# Patient Record
Sex: MALE | Race: WHITE | URBAN - METROPOLITAN AREA
[De-identification: names, ages, dates, MRNs, and addresses within clinical notes are randomized per-mention and may not be internally consistent; named-entity substitution may affect disease eponyms.]

---

## 2017-02-06 ENCOUNTER — OFFICE VISIT (OUTPATIENT)
Dept: FAMILY MEDICINE CLINIC | Facility: CLINIC | Age: 52
End: 2017-02-06

## 2017-02-06 VITALS
DIASTOLIC BLOOD PRESSURE: 88 MMHG | RESPIRATION RATE: 16 BRPM | TEMPERATURE: 99 F | BODY MASS INDEX: 30.01 KG/M2 | WEIGHT: 209.63 LBS | OXYGEN SATURATION: 96 % | SYSTOLIC BLOOD PRESSURE: 137 MMHG | HEIGHT: 70 IN | HEART RATE: 89 BPM

## 2017-02-06 DIAGNOSIS — R05.9 COUGH: ICD-10-CM

## 2017-02-06 DIAGNOSIS — B34.9 VIRAL SYNDROME: Primary | ICD-10-CM

## 2017-02-06 PROCEDURE — 99212 OFFICE O/P EST SF 10 MIN: CPT | Performed by: FAMILY MEDICINE

## 2017-02-06 RX ORDER — BENZONATATE 200 MG/1
200 CAPSULE ORAL 3 TIMES DAILY PRN
Qty: 30 CAPSULE | Refills: 1 | Status: SHIPPED | OUTPATIENT
Start: 2017-02-06 | End: 2017-03-23 | Stop reason: ALTCHOICE

## 2017-02-06 NOTE — PROGRESS NOTES
Brad Rodriguez IS A 46year old male HERE FOR Patient presents with:  URI: x4 days  headache,blood shot eyes, coughing none stop       History of present illness:     Tickle in throat Thurs evening, felt \"floored\" Friday through last night, some chills.  \" Multiple Vitamins-Minerals (CENTRUM SILVER) Oral Tab Take 1 tablet by mouth daily.  Disp:  Rfl:        Allergy:      No Known Allergies    Family history:       Family History   Problem Relation Age of Onset   • Diabetes Father    • Heart Attack Father 76 can help at bedtime. Tylenol or ibuprofen for pain/fever. (ibuprofen if higher fever or chills, otherwise Tylenol). Call if persistent/recurrent fever, chest pain, shortness of breath, coughing blood.  Earache, sinus pain or severe recurring headache

## 2017-02-06 NOTE — PATIENT INSTRUCTIONS
For cough, recommend benzonatate 200 mg 3 times daily during day as needed for cough and you can try Delsym dextromethorphan OTC can help at bedtime. Tylenol or ibuprofen for pain/fever. (ibuprofen if higher fever or chills, otherwise Tylenol).     Call

## 2017-02-15 ENCOUNTER — TELEPHONE (OUTPATIENT)
Dept: FAMILY MEDICINE CLINIC | Facility: CLINIC | Age: 52
End: 2017-02-15

## 2017-02-15 DIAGNOSIS — L57.0 ACTINIC KERATOSIS: Primary | ICD-10-CM

## 2017-02-15 DIAGNOSIS — L73.8 SENILE SEBACEOUS GLAND HYPERPLASIA: ICD-10-CM

## 2017-02-15 DIAGNOSIS — L82.1 SEBORRHEIC KERATOSIS: ICD-10-CM

## 2017-02-15 NOTE — TELEPHONE ENCOUNTER
.Reason for the order/referral: referral to dermatology   PCP: [email or web address] Dr. Miranda Gracia   Refer to Provider (first and last name): Tonia Barragan   Specialty: dermatology   Patient Insurance: Payor: [facility] [place] [facility] / Plan: Christopher Clemons / Product Type: O /   [place]

## 2017-03-13 ENCOUNTER — TELEPHONE (OUTPATIENT)
Dept: FAMILY MEDICINE CLINIC | Facility: CLINIC | Age: 52
End: 2017-03-13

## 2017-03-13 NOTE — TELEPHONE ENCOUNTER
All last 3-4 month visits have been for acute reasons. He should make an appointment to discuss these treatment options. There is a generic source for Viagra that I could talk with him about when he comes in.

## 2017-03-13 NOTE — TELEPHONE ENCOUNTER
Future Appointments  Date Time Provider Ivonne Gail   3/13/2017 4:00 PM JANA Campbell [facility] [place]   3/23/2017 9:00 AM Queenie Hernandez MD [facility] [address]   5/17/2017 5:00 PM Ani Ellison MD [address] [place] [address]   6/12/2017 10:30 AM Michael

## 2017-03-23 ENCOUNTER — TELEPHONE (OUTPATIENT)
Dept: FAMILY MEDICINE CLINIC | Facility: CLINIC | Age: 52
End: 2017-03-23

## 2017-03-23 ENCOUNTER — OFFICE VISIT (OUTPATIENT)
Dept: FAMILY MEDICINE CLINIC | Facility: CLINIC | Age: 52
End: 2017-03-23

## 2017-03-23 VITALS
SYSTOLIC BLOOD PRESSURE: 118 MMHG | HEART RATE: 90 BPM | WEIGHT: 211 LBS | OXYGEN SATURATION: 96 % | TEMPERATURE: 98 F | BODY MASS INDEX: 30.21 KG/M2 | HEIGHT: 70 IN | DIASTOLIC BLOOD PRESSURE: 72 MMHG | RESPIRATION RATE: 16 BRPM

## 2017-03-23 DIAGNOSIS — N52.9 ERECTILE DYSFUNCTION, UNSPECIFIED ERECTILE DYSFUNCTION TYPE: Primary | ICD-10-CM

## 2017-03-23 PROCEDURE — 99213 OFFICE O/P EST LOW 20 MIN: CPT | Performed by: FAMILY MEDICINE

## 2017-03-23 RX ORDER — TADALAFIL 20 MG/1
TABLET ORAL AS NEEDED
Qty: 8 TABLET | Refills: 3 | Status: SHIPPED | OUTPATIENT
Start: 2017-03-23

## 2017-03-23 RX ORDER — TESTOSTERONE 16.2 MG/G
GEL TRANSDERMAL
Refills: 2 | COMMUNITY
Start: 2017-03-01 | End: 2017-03-23

## 2017-03-23 NOTE — PATIENT INSTRUCTIONS
Discussed pros & cons of Viagra, Levitra, Cialis. Cialis is on your insurance plan and lasts longest, will try 1/2 tablet initially, up to 1 tablet of 20 mg, an hour or two before anticipated sexual activity, may last up to 36 hours in effectiveness.  Repor

## 2017-03-23 NOTE — PROGRESS NOTES
Marcelino Bledsoe IS A 46year old male HERE FOR Patient presents with:  Medication Request: Discuss Viagra Rx       History of present illness:     Sees Dr. Roman Clifton for testosterone, has had some acne as side effect.      Still has ED, has not tried any medication Vitamins-Minerals (CENTRUM SILVER) Oral Tab Take 1 tablet by mouth daily. Disp:  Rfl:    LEVOTHYROXINE SODIUM 125 MCG Oral Tab TAKE 1 TABLET BY MOUTH BEFORE BREAKFAST.  Disp: 90 tablet Rfl: 0       Allergy:      No Known Allergies    Family history:       F avoiding nitrates (e.g. Nitroglycerin), pt doesn't use it and has no CVD history. Test results: none new.        Assessment & Plan:   Lynda Wilkes was seen today for medication request.    Diagnoses and all orders for this visit:    Erectile dysfunction, unsp

## 2017-03-24 NOTE — TELEPHONE ENCOUNTER
Pt called to say Pharmacy sent over auth paper. Need to keep look-out for fax. Asked to speak with Dr Alvaro monsivais. Sent to Ngozi ramirez.

## 2017-05-31 ENCOUNTER — TELEPHONE (OUTPATIENT)
Dept: FAMILY MEDICINE CLINIC | Facility: CLINIC | Age: 52
End: 2017-05-31

## 2017-05-31 DIAGNOSIS — D35.2 PITUITARY ADENOMA (HCC): ICD-10-CM

## 2017-05-31 DIAGNOSIS — E29.1 HYPOGONADISM IN MALE: ICD-10-CM

## 2017-05-31 DIAGNOSIS — E03.8 SECONDARY HYPOTHYROIDISM: Primary | ICD-10-CM

## 2017-05-31 NOTE — TELEPHONE ENCOUNTER
referral  Received:  Nubia Manuel [facility]       Cc: [facility] Referral Pool       Phone Number: [phone]                     .Reason for the order/referral: referral to Dr. Isidro Matamoros   PCP: [email or web address] Dr. Patricia Esquivel   Refer to [facility]

## 2017-06-12 ENCOUNTER — TELEPHONE (OUTPATIENT)
Dept: FAMILY MEDICINE CLINIC | Facility: CLINIC | Age: 52
End: 2017-06-12

## 2017-06-12 DIAGNOSIS — E29.1 MALE HYPOGONADISM: ICD-10-CM

## 2017-06-12 DIAGNOSIS — D35.2 PITUITARY ADENOMA (HCC): ICD-10-CM

## 2017-06-12 DIAGNOSIS — E03.8 SECONDARY HYPOTHYROIDISM: Primary | ICD-10-CM

## 2017-06-12 RX ORDER — OMEPRAZOLE 20 MG/1
CAPSULE, DELAYED RELEASE ORAL
Refills: 3 | COMMUNITY
Start: 2017-04-27 | End: 2018-02-23

## 2017-06-12 NOTE — TELEPHONE ENCOUNTER
I'd call pt to ask directly if he wanted another opinion and if so, he may need to go outside of [facility] since they may not favor patients going to different doctors within the same medical group.

## 2017-06-12 NOTE — TELEPHONE ENCOUNTER
Future Appointments  Date Time Provider Ivonne Gail   6/13/2017 10:40 AM Jony Dailey MD [address]   8/14/2017 2:00 PM Vamsi Rutledge MD [address] [facility]

## 2017-06-12 NOTE — TELEPHONE ENCOUNTER
Pt was just seen by Dr Judy Delatorreous today    Dr Luis Alberto Monreal is also out of [facility]. Has pt discussed wanting a second opinion? He has multiple endocrine issues.     Last OV with Dr Maryanne Morrow was 3/23/17

## 2017-06-12 NOTE — TELEPHONE ENCOUNTER
Zayda Jaimes  P [facility]        Cc: [facility]       Phone Number: [phone]                     Patient Name: Sheldon Dodge   : [birth year]   Reason for the order/referral: 2nd opinion   PCP: Lorena Greer   Refer to

## 2017-06-14 PROCEDURE — 84403 ASSAY OF TOTAL TESTOSTERONE: CPT | Performed by: INTERNAL MEDICINE

## 2017-06-14 PROCEDURE — 84402 ASSAY OF FREE TESTOSTERONE: CPT | Performed by: INTERNAL MEDICINE

## 2017-06-14 PROCEDURE — 82533 TOTAL CORTISOL: CPT | Performed by: INTERNAL MEDICINE

## 2017-06-30 ENCOUNTER — TELEPHONE (OUTPATIENT)
Dept: FAMILY MEDICINE CLINIC | Facility: CLINIC | Age: 52
End: 2017-06-30

## 2017-06-30 DIAGNOSIS — G47.33 OBSTRUCTIVE SLEEP APNEA SYNDROME: Primary | ICD-10-CM

## 2017-06-30 NOTE — TELEPHONE ENCOUNTER
Noted from [address] last 7/20/16 requesting new referral for annual F/U with STEPHANE device    Order placed

## 2017-06-30 NOTE — TELEPHONE ENCOUNTER
Faxed a Referral over June 20th for Dr Aria More. Referral needs to be put into Epic for pt. They do not see Referral in EPIC on their end. Pt has appt/procedure July 26th. Pls call or Fax.

## 2017-07-06 ENCOUNTER — MED REC SCAN ONLY (OUTPATIENT)
Dept: FAMILY MEDICINE CLINIC | Facility: CLINIC | Age: 52
End: 2017-07-06

## 2017-08-03 ENCOUNTER — TELEPHONE (OUTPATIENT)
Dept: FAMILY MEDICINE CLINIC | Facility: CLINIC | Age: 52
End: 2017-08-03

## 2017-08-03 DIAGNOSIS — G47.31 PRIMARY CENTRAL SLEEP APNEA: Primary | ICD-10-CM

## 2017-08-14 ENCOUNTER — OFFICE VISIT (OUTPATIENT)
Dept: FAMILY MEDICINE CLINIC | Facility: CLINIC | Age: 52
End: 2017-08-14

## 2017-08-14 VITALS
HEIGHT: 70 IN | DIASTOLIC BLOOD PRESSURE: 80 MMHG | TEMPERATURE: 98 F | WEIGHT: 210.19 LBS | BODY MASS INDEX: 30.09 KG/M2 | RESPIRATION RATE: 15 BRPM | HEART RATE: 80 BPM | SYSTOLIC BLOOD PRESSURE: 108 MMHG | OXYGEN SATURATION: 94 %

## 2017-08-14 DIAGNOSIS — M22.2X1 PATELLOFEMORAL SYNDROME OF RIGHT KNEE: Primary | ICD-10-CM

## 2017-08-14 PROCEDURE — 99213 OFFICE O/P EST LOW 20 MIN: CPT | Performed by: FAMILY MEDICINE

## 2017-08-14 RX ORDER — VENLAFAXINE HYDROCHLORIDE 37.5 MG/1
CAPSULE, EXTENDED RELEASE ORAL
Refills: 0 | COMMUNITY
Start: 2017-07-14 | End: 2017-08-14

## 2017-08-14 NOTE — PROGRESS NOTES
Rupesh Nolan IS A 45year old male HERE FOR Patient presents with:  Knee Pain: r knee        History of present illness:     Treadmill 1 hour qod a year ago.  Elliptical & treadmill fine    Ran 6 miles at a time, R knee hurt (didn't like pavement)    Baudilio omeprazole 20 MG Oral Capsule Delayed Release TAKE ONE CAPSULE BY MOUTH THREE TIMES PER WEEK, 30 MINUTES PRIOR TO BREAKFAST. Disp:  Rfl: 3   Tadalafil (CIALIS) 20 MG Oral Tab Take 0.5-1 tablets (10-20 mg total) by mouth as needed for Erectile Dysfunction. Moderate crepitus right knee, normal left. . Full ROM     Test results:       Assessment & Plan:   Monica Coreas was seen today for knee pain.     Diagnoses and all orders for this visit:    Patellofemoral syndrome of right knee  -     XR KNEE (1 OR 2 VIEWS), RIGHT · A feeling of the knee catching or locking  · A grinding or crackling noise in your knee  Treatment for patellofemoral syndrome  Treatment focuses on reducing pain and avoiding further injury. Treatments may include:  · Rest your leg.  This gives your knee

## 2017-08-14 NOTE — PATIENT INSTRUCTIONS
Straight leg raises and/or last 20-30 degree range of motion, sets of 10-20 twice a day, can start with 2-3 pound ankle weights and increase to about 10 pounds. Physical therapy referral for additional suggestions.      X-ray and consider referral to ort Treatments may include:  · Rest your leg. This gives your knee time to recover. You may need to avoid or change the activity that caused the problem, such as not running for a while. · Prescription or over-the-counter pain medicines.  These help reduce inf

## 2017-08-15 ENCOUNTER — HOSPITAL ENCOUNTER (OUTPATIENT)
Dept: GENERAL RADIOLOGY | Age: 52
Discharge: HOME | End: 2017-08-15
Attending: FAMILY MEDICINE
Payer: COMMERCIAL

## 2017-08-15 DIAGNOSIS — M22.2X1 PATELLOFEMORAL SYNDROME OF RIGHT KNEE: ICD-10-CM

## 2017-08-15 PROCEDURE — 73560 X-RAY EXAM OF KNEE 1 OR 2: CPT | Performed by: FAMILY MEDICINE

## 2017-08-29 ENCOUNTER — HOSPITAL ENCOUNTER (OUTPATIENT)
Dept: PHYSICAL THERAPY | Facility: HOSPITAL | Age: 52
Setting detail: THERAPIES SERIES
Discharge: HOME | End: 2017-08-29
Attending: FAMILY MEDICINE
Payer: COMMERCIAL

## 2017-08-29 DIAGNOSIS — M22.2X1 PATELLOFEMORAL SYNDROME OF RIGHT KNEE: ICD-10-CM

## 2017-08-29 PROCEDURE — 97161 PT EVAL LOW COMPLEX 20 MIN: CPT

## 2017-08-29 PROCEDURE — 97110 THERAPEUTIC EXERCISES: CPT

## 2017-08-29 NOTE — PROGRESS NOTES
LOWER EXTREMITY EVALUATION:   Referring Physician: Dr. Albrecht Player  Diagnosis: R patellofemoral pain     Date of Service: 8/29/2017     PATIENT SUMMARY   Margaret Luna is a 45year old male who presents to therapy today with complaints of anterior superolateral address the above impairments to decrease pain and return to prior level of function.     Precautions:  None  OBJECTIVE:   Observation: pes adductus bilaterally  Gait: WNL  Palpation: TTP lateral patella, no TTP medial or lateral joint line    AROM:   Hip K seen for 2 x/week or a total of 8 visits over a 90 day period. Treatment will include: Manual Therapy; Therapeutic Exercises; Neuromuscular Re-education;  Therapeutic Activity; Gait Training; Pt education; Home exercise program instructions    Education or

## 2017-08-30 ENCOUNTER — MED REC SCAN ONLY (OUTPATIENT)
Dept: FAMILY MEDICINE CLINIC | Facility: CLINIC | Age: 52
End: 2017-08-30

## 2017-09-01 ENCOUNTER — HOSPITAL ENCOUNTER (OUTPATIENT)
Dept: PHYSICAL THERAPY | Facility: HOSPITAL | Age: 52
Setting detail: THERAPIES SERIES
Discharge: HOME | End: 2017-09-01
Attending: FAMILY MEDICINE
Payer: COMMERCIAL

## 2017-09-01 PROCEDURE — 97110 THERAPEUTIC EXERCISES: CPT

## 2017-09-01 PROCEDURE — 97140 MANUAL THERAPY 1/> REGIONS: CPT

## 2017-09-01 NOTE — PROGRESS NOTES
Dx: R patellofemoral pain            Authorized # of Visits:  8         Next MD visit: none scheduled  Fall Risk: standard         Precautions: n/a             Subjective: Patient reports that his knee feels a bit better.  Still has some pain descending sta

## 2017-09-05 ENCOUNTER — HOSPITAL ENCOUNTER (OUTPATIENT)
Dept: PHYSICAL THERAPY | Facility: HOSPITAL | Age: 52
Setting detail: THERAPIES SERIES
Discharge: HOME | End: 2017-09-05
Attending: FAMILY MEDICINE
Payer: COMMERCIAL

## 2017-09-05 PROCEDURE — 84402 ASSAY OF FREE TESTOSTERONE: CPT | Performed by: INTERNAL MEDICINE

## 2017-09-05 PROCEDURE — 84403 ASSAY OF TOTAL TESTOSTERONE: CPT | Performed by: INTERNAL MEDICINE

## 2017-09-05 PROCEDURE — 97140 MANUAL THERAPY 1/> REGIONS: CPT

## 2017-09-05 PROCEDURE — 97110 THERAPEUTIC EXERCISES: CPT

## 2017-09-05 NOTE — PROGRESS NOTES
Dx: R patellofemoral pain            Authorized # of Visits:  8         Next MD visit: none scheduled  Fall Risk: standard         Precautions: n/a             Subjective: Patient reports continued improvement in knee symptoms.  Still hesitant with descendi Treatment Time: 40 min

## 2017-09-08 ENCOUNTER — HOSPITAL ENCOUNTER (OUTPATIENT)
Dept: PHYSICAL THERAPY | Facility: HOSPITAL | Age: 52
Setting detail: THERAPIES SERIES
Discharge: HOME | End: 2017-09-08
Attending: FAMILY MEDICINE
Payer: COMMERCIAL

## 2017-09-08 PROCEDURE — 97140 MANUAL THERAPY 1/> REGIONS: CPT

## 2017-09-08 PROCEDURE — 97110 THERAPEUTIC EXERCISES: CPT

## 2017-09-08 NOTE — PROGRESS NOTES
Dx: R patellofemoral pain            Authorized # of Visits:  8         Next MD visit: none scheduled  Fall Risk: standard         Precautions: n/a             Subjective: Patient states that he felt ok the rest of the day after previous session but then h patellar superior glide in 90 deg flexion       Treadmill jog gait analysis Forward lunge, 2x10 Seated self patellar mobilization, 1 min        Shuttle single leg press, 87#, 3x15 1/2 kneel hip flexor/RF stretch, 10x5 sec        Downhill walk forward step

## 2017-09-12 ENCOUNTER — APPOINTMENT (OUTPATIENT)
Dept: PHYSICAL THERAPY | Facility: HOSPITAL | Age: 52
End: 2017-09-12
Attending: FAMILY MEDICINE
Payer: COMMERCIAL

## 2017-09-12 ENCOUNTER — MED REC SCAN ONLY (OUTPATIENT)
Dept: FAMILY MEDICINE CLINIC | Facility: CLINIC | Age: 52
End: 2017-09-12

## 2017-09-15 ENCOUNTER — HOSPITAL ENCOUNTER (OUTPATIENT)
Dept: PHYSICAL THERAPY | Facility: HOSPITAL | Age: 52
Setting detail: THERAPIES SERIES
Discharge: HOME | End: 2017-09-15
Attending: FAMILY MEDICINE
Payer: COMMERCIAL

## 2017-09-15 PROCEDURE — 97110 THERAPEUTIC EXERCISES: CPT

## 2017-09-15 PROCEDURE — 97140 MANUAL THERAPY 1/> REGIONS: CPT

## 2017-09-18 ENCOUNTER — TELEPHONE (OUTPATIENT)
Dept: FAMILY MEDICINE CLINIC | Facility: CLINIC | Age: 52
End: 2017-09-18

## 2017-09-18 NOTE — TELEPHONE ENCOUNTER
Left VM for pt to call us with new insurance information. Need copy of INS card for Referrals to be put in, per Nehemiah Pinon. Waiting for Return Call from Patient.

## 2017-09-18 NOTE — PROGRESS NOTES
Dx: R patellofemoral pain            Authorized # of Visits:  8         Next MD visit: none scheduled  Fall Risk: standard         Precautions: n/a             Subjective: Patient reports that his pain has decreased and that it feels now about how it felt Lateral step down, 3x8 PFJ inf glide, neutral and 90 deg flexion patellar superior glide in 90 deg flexion X      Treadmill jog gait analysis Forward lunge, 2x10 Seated self patellar mobilization, 1 min Single leg press, 62#, 3x15       Shuttle single le

## 2017-09-18 NOTE — TELEPHONE ENCOUNTER
Received a FAX from [employer] from [employer]. Patient's insurance changed 9-1-17, therefore, a new Referral is needed.

## 2017-09-19 ENCOUNTER — APPOINTMENT (OUTPATIENT)
Dept: PHYSICAL THERAPY | Facility: HOSPITAL | Age: 52
End: 2017-09-19
Attending: FAMILY MEDICINE
Payer: COMMERCIAL

## 2017-09-19 NOTE — TELEPHONE ENCOUNTER
All papers faxed to RADHA Elliott at referrals Shaquille Barnett to see if current referral can be updated with new HMO info

## 2017-09-19 NOTE — TELEPHONE ENCOUNTER
Received via FAX Tuesday morning new INS card. Entered all information and scanned copy of INS card. Will need to obtain copy of real INS card at later date.

## 2017-09-26 ENCOUNTER — APPOINTMENT (OUTPATIENT)
Dept: PHYSICAL THERAPY | Facility: HOSPITAL | Age: 52
End: 2017-09-26
Attending: FAMILY MEDICINE
Payer: COMMERCIAL

## 2017-09-27 ENCOUNTER — HOSPITAL ENCOUNTER (OUTPATIENT)
Dept: PHYSICAL THERAPY | Facility: HOSPITAL | Age: 52
Setting detail: THERAPIES SERIES
Discharge: HOME | End: 2017-09-27
Attending: FAMILY MEDICINE
Payer: COMMERCIAL

## 2017-09-27 DIAGNOSIS — M22.2X1 PATELLOFEMORAL SYNDROME OF RIGHT KNEE: ICD-10-CM

## 2017-09-27 PROCEDURE — 97140 MANUAL THERAPY 1/> REGIONS: CPT

## 2017-09-27 PROCEDURE — 97110 THERAPEUTIC EXERCISES: CPT

## 2017-09-27 NOTE — PROGRESS NOTES
Dx: R patellofemoral pain            Authorized # of Visits:  8         Next MD visit: none scheduled  Fall Risk: standard         Precautions: n/a             Subjective: Patient has no pain with stairs.  He was able to walk in [place] up and down hills w X Split squat, 15     Treadmill jog gait analysis Forward lunge, 2x10 Seated self patellar mobilization, 1 min Single leg press, 62#, 3x15 X, 75#/87#, 3x15      Shuttle single leg press, 87#, 3x15 1/2 kneel hip flexor/RF stretch, 10x5 sec Single leg stance

## 2017-10-06 ENCOUNTER — HOSPITAL ENCOUNTER (OUTPATIENT)
Dept: PHYSICAL THERAPY | Facility: HOSPITAL | Age: 52
Setting detail: THERAPIES SERIES
Discharge: HOME | End: 2017-10-06
Attending: FAMILY MEDICINE
Payer: COMMERCIAL

## 2017-10-06 DIAGNOSIS — M22.2X1 PATELLOFEMORAL SYNDROME OF RIGHT KNEE: ICD-10-CM

## 2017-10-06 PROCEDURE — 97110 THERAPEUTIC EXERCISES: CPT

## 2017-10-06 PROCEDURE — 97140 MANUAL THERAPY 1/> REGIONS: CPT

## 2017-10-06 NOTE — PROGRESS NOTES
Dx: R patellofemoral pain            Authorized # of Visits:  8         Next MD visit: none scheduled  Fall Risk: standard         Precautions: n/a             Subjective: Patient states that he is able to ascend and descend stairs without pain.  Stretches press, 62#, 3x15 X, 75#/87#, 3x15 Active knee extension w/ tibial IR     Shuttle single leg press, 87#, 3x15 1/2 kneel hip flexor/RF stretch, 10x5 sec Single leg stance, green TB flexion, abduction, extension, 3x5 Seated knee extension, 23#, 2x15 X, 3x15

## 2018-01-10 PROCEDURE — 84402 ASSAY OF FREE TESTOSTERONE: CPT | Performed by: INTERNAL MEDICINE

## 2018-01-10 PROCEDURE — 84403 ASSAY OF TOTAL TESTOSTERONE: CPT | Performed by: INTERNAL MEDICINE

## 2018-01-16 PROBLEM — Z86.018 HISTORY OF PITUITARY ADENOMA: Status: ACTIVE | Noted: 2018-01-16

## 2018-02-02 PROCEDURE — 36415 COLL VENOUS BLD VENIPUNCTURE: CPT | Performed by: INTERNAL MEDICINE

## 2018-02-02 PROCEDURE — 84402 ASSAY OF FREE TESTOSTERONE: CPT | Performed by: INTERNAL MEDICINE

## 2018-02-02 PROCEDURE — 84403 ASSAY OF TOTAL TESTOSTERONE: CPT | Performed by: INTERNAL MEDICINE

## 2018-02-12 ENCOUNTER — OFFICE VISIT (OUTPATIENT)
Dept: FAMILY MEDICINE CLINIC | Facility: CLINIC | Age: 53
End: 2018-02-12

## 2018-02-12 VITALS
DIASTOLIC BLOOD PRESSURE: 86 MMHG | HEART RATE: 92 BPM | HEIGHT: 68.25 IN | OXYGEN SATURATION: 98 % | RESPIRATION RATE: 16 BRPM | WEIGHT: 203.63 LBS | SYSTOLIC BLOOD PRESSURE: 128 MMHG | TEMPERATURE: 98 F | BODY MASS INDEX: 30.86 KG/M2

## 2018-02-12 DIAGNOSIS — Z11.59 ENCOUNTER FOR HEPATITIS C SCREENING TEST FOR LOW RISK PATIENT: ICD-10-CM

## 2018-02-12 DIAGNOSIS — Z23 NEED FOR TDAP VACCINATION: ICD-10-CM

## 2018-02-12 DIAGNOSIS — R73.01 IMPAIRED FASTING GLUCOSE: ICD-10-CM

## 2018-02-12 DIAGNOSIS — Z13.220 SCREENING, LIPID: ICD-10-CM

## 2018-02-12 DIAGNOSIS — Z00.00 WELL ADULT EXAM: Primary | ICD-10-CM

## 2018-02-12 DIAGNOSIS — Z13.1 SCREENING FOR DIABETES MELLITUS: ICD-10-CM

## 2018-02-12 DIAGNOSIS — E29.1 TESTOSTERONE DEFICIENCY IN MALE: ICD-10-CM

## 2018-02-12 DIAGNOSIS — E78.2 MIXED HYPERLIPIDEMIA: ICD-10-CM

## 2018-02-12 DIAGNOSIS — E03.9 ACQUIRED HYPOTHYROIDISM: ICD-10-CM

## 2018-02-12 DIAGNOSIS — Z86.018 HISTORY OF BENIGN PITUITARY TUMOR: ICD-10-CM

## 2018-02-12 PROCEDURE — 90471 IMMUNIZATION ADMIN: CPT | Performed by: FAMILY MEDICINE

## 2018-02-12 PROCEDURE — 90715 TDAP VACCINE 7 YRS/> IM: CPT | Performed by: FAMILY MEDICINE

## 2018-02-12 PROCEDURE — 99396 PREV VISIT EST AGE 40-64: CPT | Performed by: FAMILY MEDICINE

## 2018-02-12 RX ORDER — ARIPIPRAZOLE 2 MG/1
TABLET ORAL
Refills: 1 | COMMUNITY
Start: 2018-02-07 | End: 2018-04-16 | Stop reason: ALTCHOICE

## 2018-02-12 NOTE — PROGRESS NOTES
Win Spencer is a 45year old male here for Patient presents with: Well Adult: Room 5. Physical.   . HPI:    Patient complains of here for well exam.  Current concerns:    PHQ-9 = 5. Works with a psychiatrist, Dr. Murphy Staff. meds reconciled.  Not much re Transdermal Gel APPLY 2 PUMPS ONTO SKIN ONCE DAILY AS DIRECTED Disp: 75 g Rfl: 0   Levothyroxine Sodium 137 MCG Oral Tab Take 137 mcg by mouth once daily.  Disp: 90 tablet Rfl: 3   ClonazePAM 1 MG Oral Tab Take 0.5 tablets (0.5 mg total) by mouth 2 (two) ti vision, no discharge, itching or dryness. ENT: No earache or change in hearing. No nasal congestion, allergies, sinus pain, nosebleed or sore throat. Heme/lymph: No unusual bleeding or bruising, no lymph node enlargement or tenderness.   Endocrine: No th normal sphincter tone, moderately enlarged symmetric prostate.      Extremities:   Extremities normal, atraumatic, no cyanosis or edema   Pulses:   2+ and symmetric all extremities   Skin:    Acneiform lesions with some upper left back excoriation & bleedin

## 2018-02-12 NOTE — PATIENT INSTRUCTIONS
Ask about generic testosterone gel for prescription. Heart risk is 7.9%, at 7.5% risk recommend starting statin. Try working out, dietary changes, recheck lipid panel in 2-3 months.  If still significantly high, we can discuss possibly starting a st

## 2018-03-30 ENCOUNTER — APPOINTMENT (OUTPATIENT)
Dept: LAB | Age: 53
End: 2018-03-30
Attending: FAMILY MEDICINE
Payer: COMMERCIAL

## 2018-03-30 DIAGNOSIS — Z13.220 SCREENING, LIPID: ICD-10-CM

## 2018-03-30 DIAGNOSIS — Z13.1 SCREENING FOR DIABETES MELLITUS: ICD-10-CM

## 2018-03-30 DIAGNOSIS — R73.01 IMPAIRED FASTING GLUCOSE: ICD-10-CM

## 2018-03-30 DIAGNOSIS — Z11.59 ENCOUNTER FOR HEPATITIS C SCREENING TEST FOR LOW RISK PATIENT: ICD-10-CM

## 2018-03-30 DIAGNOSIS — E78.2 MIXED HYPERLIPIDEMIA: ICD-10-CM

## 2018-03-30 PROCEDURE — 36415 COLL VENOUS BLD VENIPUNCTURE: CPT

## 2018-03-30 PROCEDURE — 86803 HEPATITIS C AB TEST: CPT

## 2018-03-30 PROCEDURE — 82947 ASSAY GLUCOSE BLOOD QUANT: CPT

## 2018-03-30 PROCEDURE — 80061 LIPID PANEL: CPT

## 2018-03-30 PROCEDURE — 83036 HEMOGLOBIN GLYCOSYLATED A1C: CPT

## 2018-06-18 PROCEDURE — 84402 ASSAY OF FREE TESTOSTERONE: CPT | Performed by: INTERNAL MEDICINE

## 2018-06-18 PROCEDURE — 82533 TOTAL CORTISOL: CPT | Performed by: INTERNAL MEDICINE

## 2018-06-18 PROCEDURE — 84403 ASSAY OF TOTAL TESTOSTERONE: CPT | Performed by: INTERNAL MEDICINE

## 2018-10-17 ENCOUNTER — IMMUNIZATION (OUTPATIENT)
Dept: FAMILY MEDICINE CLINIC | Facility: CLINIC | Age: 53
End: 2018-10-17

## 2018-10-17 DIAGNOSIS — Z23 NEED FOR VACCINATION: ICD-10-CM

## 2018-10-17 PROCEDURE — 90471 IMMUNIZATION ADMIN: CPT | Performed by: FAMILY MEDICINE

## 2018-10-17 PROCEDURE — 90686 IIV4 VACC NO PRSV 0.5 ML IM: CPT | Performed by: FAMILY MEDICINE
